# Patient Record
Sex: FEMALE | Race: BLACK OR AFRICAN AMERICAN | Employment: OTHER | URBAN - METROPOLITAN AREA
[De-identification: names, ages, dates, MRNs, and addresses within clinical notes are randomized per-mention and may not be internally consistent; named-entity substitution may affect disease eponyms.]

---

## 2017-04-10 ENCOUNTER — HOSPITAL ENCOUNTER (OUTPATIENT)
Dept: MAMMOGRAPHY | Age: 72
Discharge: HOME | End: 2017-04-10
Attending: INTERNAL MEDICINE | Admitting: INTERNAL MEDICINE

## 2017-04-10 DIAGNOSIS — Z12.31 VISIT FOR SCREENING MAMMOGRAM: ICD-10-CM

## 2018-04-11 ENCOUNTER — HOSPITAL ENCOUNTER (OUTPATIENT)
Dept: MAMMOGRAPHY | Age: 73
Discharge: HOME | End: 2018-04-11
Attending: INTERNAL MEDICINE | Admitting: INTERNAL MEDICINE

## 2018-04-11 DIAGNOSIS — Z12.31 VISIT FOR SCREENING MAMMOGRAM: ICD-10-CM

## 2019-04-26 ENCOUNTER — HOSPITAL ENCOUNTER (OUTPATIENT)
Dept: MAMMOGRAPHY | Age: 74
Discharge: HOME | End: 2019-04-26
Payer: MEDICARE

## 2019-04-26 DIAGNOSIS — Z12.31 VISIT FOR SCREENING MAMMOGRAM: ICD-10-CM

## 2019-04-26 PROCEDURE — 77067 SCR MAMMO BI INCL CAD: CPT

## 2019-04-30 ENCOUNTER — HOSPITAL ENCOUNTER (OUTPATIENT)
Dept: MAMMOGRAPHY | Age: 74
Discharge: HOME | End: 2019-04-30
Payer: MEDICARE

## 2019-04-30 DIAGNOSIS — R92.8 ABNORMAL MAMMOGRAM: ICD-10-CM

## 2019-04-30 PROCEDURE — 77065 DX MAMMO INCL CAD UNI: CPT

## 2019-10-22 ENCOUNTER — HOSPITAL ENCOUNTER (OUTPATIENT)
Dept: MAMMOGRAPHY | Age: 74
Discharge: HOME | End: 2019-10-22
Payer: MEDICARE

## 2019-10-22 DIAGNOSIS — R92.8 ABNORMAL MAMMOGRAM: ICD-10-CM

## 2019-10-22 PROCEDURE — G0279 TOMOSYNTHESIS, MAMMO: HCPCS

## 2025-05-25 ENCOUNTER — HOSPITAL ENCOUNTER (EMERGENCY)
Age: 80
Discharge: HOME | End: 2025-05-25
Attending: EMERGENCY MEDICINE
Payer: MEDICARE

## 2025-05-25 ENCOUNTER — APPOINTMENT (OUTPATIENT)
Dept: GENERAL RADIOLOGY | Age: 80
End: 2025-05-25
Payer: MEDICARE

## 2025-05-25 VITALS
HEIGHT: 66 IN | TEMPERATURE: 98.2 F | WEIGHT: 160.9 LBS | OXYGEN SATURATION: 96 % | BODY MASS INDEX: 25.86 KG/M2 | HEART RATE: 99 BPM | RESPIRATION RATE: 18 BRPM | SYSTOLIC BLOOD PRESSURE: 139 MMHG | DIASTOLIC BLOOD PRESSURE: 70 MMHG

## 2025-05-25 DIAGNOSIS — S82.891A CLOSED FRACTURE OF RIGHT ANKLE, INITIAL ENCOUNTER: Primary | ICD-10-CM

## 2025-05-25 PROCEDURE — 73610 X-RAY EXAM OF ANKLE: CPT

## 2025-05-25 PROCEDURE — 73630 X-RAY EXAM OF FOOT: CPT

## 2025-05-25 PROCEDURE — 99283 EMERGENCY DEPT VISIT LOW MDM: CPT

## 2025-05-25 PROCEDURE — 99284 EMERGENCY DEPT VISIT MOD MDM: CPT | Performed by: ORTHOPAEDIC SURGERY

## 2025-05-25 RX ORDER — HYDROCODONE BITARTRATE AND ACETAMINOPHEN 5; 325 MG/1; MG/1
1 TABLET ORAL EVERY 6 HOURS PRN
Qty: 12 TABLET | Refills: 0 | Status: SHIPPED | OUTPATIENT
Start: 2025-05-25 | End: 2025-05-28

## 2025-05-25 NOTE — DISCHARGE INSTRUCTIONS
Do not bear weight on your right foot until cleared by your orthopedic surgeon.  Use crutches to keep weight off right foot.

## 2025-05-25 NOTE — CONSULTS
[facility] Orthopedic Surgery  Consult Note         This patient is seen in consultation at the request of Dr Belcher, Husam MACK MD     Reason for Consult:  Right ankle pain / lateral malleolus mildly displaced fracture with medial malleus avulsion fracture.    CHIEF COMPLAINT:  Right ankle pain / lateral malleolus mildly displaced fracture with medial malleus avulsion fracture.    History Obtained From:  patient, family member - daughter, electronic medical record    HISTORY OF PRESENT ILLNESS:    Ms. Lima 79 y.o.  female seen today at [facility] ED for evaluation of a right ankle injury which occurred when she was walking and tripped on 5/22/2025 while in vacation at [place] and managed to walk on it until she cam to today to [facility] ED.  She was first seen and evaluated in Urgent Care then came to [facility], where she was x-rayed, and I was consulted. She is complaining of medial & lateral ankle pain and swelling. This is better with elevation and worse with bearing any wt. The pain is sharp and not radiating. No numbness or tingling sensation. Alleviating factors: rest. No other complaint.     Past Medical History:        Diagnosis Date    'Light-for-dates' infant with signs of fetal malnutrition 7/6/2010    Acute upper respiratory infections of unspecified site 7/6/2010    Arthritis     DJD (degenerative joint disease) of lumbar spine     Early menopause     Esophageal reflux 7/6/2010    GERD (gastroesophageal reflux disease)     History of colonoscopy 4/09    (-)    Hyperlipidemia     Hypertension     Other and unspecified hyperlipidemia 7/6/2010    Spondylosis of unspecified site without mention of myelopathy 7/6/2010    Unspecified essential hypertension 7/6/2010    UTI        Past Surgical History:        Procedure Laterality Date    DILATION AND CURETTAGE OF UTERUS  1/24/14    HYSTERECTOMY (CERVIX STATUS UNKNOWN)  1/24/14       Medications prior to admission:   Prior to Admission medications

## 2025-05-25 NOTE — ED PROVIDER NOTES
[facility]  EMERGENCY DEPARTMENT ENCOUNTER          ATTENDING PHYSICIAN NOTE       Date of evaluation: 5/25/2025    Chief Complaint     Foot Pain (Pt arrives to ED after falling in [employer] a few days ago, returned home yesterday. Bruising noted to top of big toe to R foot.)      History of Present Illness     Tye Lima is a 79 y.o. female who presents with complaints of right great toe and right foot pain as well as right ankle pain after a fall 3 days ago while in [place].  This was a mechanical fall and the exact mechanism is unclear but she got tangled up in her purse and went to the ground.  She has been able to bear weight but minimally so she also sustained some bruising of her left hand.  She denies head injury.    Review of Systems     Denies numbness, paresthesia, weakness.  See HPI for further details.  Review of systems otherwise negative.    Past Medical, Surgical, Family, and Social History     Nursing Notes, Past Medical Hx, Past Surgical Hx, Social Hx, Allergies, and Family Hx were all reviewed in the electronic record and agreed with, or any disagreements were addressed in the HPI or corrected in the EMR.    Medications     Previous Medications    BENAZEPRIL (LOTENSIN) 10 MG TABLET    Take 1 tablet by mouth daily.    HYDROCHLOROTHIAZIDE (HYDRODIURIL) 25 MG TABLET    Take 1 tablet by mouth daily.    MELOXICAM (MOBIC) 15 MG TABLET    Take 1 tablet by mouth daily.    SPIRONOLACTONE-HYDROCHLOROTHIAZIDE (ALDACTAZIDE) 25-25 MG PER TABLET    Take 1 tablet by mouth daily.       Allergies     She has no known allergies.    Physical Exam     INITIAL VITALS: BP: 139/70, Temp: 98.2 °F (36.8 °C), Pulse: 99, Respirations: 18, SpO2: 96 %   Constitutional:  Well developed, well nourished, no acute distress, non-toxic appearance   Musculoskeletal: Full range of motion of right foot but limited motion of right great toe.  Tenderness is primarily over the right distal first metatarsal and

## 2025-05-28 ENCOUNTER — PREP FOR PROCEDURE (OUTPATIENT)
Dept: ORTHOPEDIC SURGERY | Age: 80
End: 2025-05-28

## 2025-05-28 ENCOUNTER — OFFICE VISIT (OUTPATIENT)
Dept: ORTHOPEDIC SURGERY | Age: 80
End: 2025-05-28
Payer: MEDICARE

## 2025-05-28 VITALS — BODY MASS INDEX: 27.48 KG/M2 | WEIGHT: 171 LBS | HEIGHT: 66 IN

## 2025-05-28 DIAGNOSIS — M25.571 ACUTE RIGHT ANKLE PAIN: Primary | ICD-10-CM

## 2025-05-28 DIAGNOSIS — S82.841A CLOSED BIMALLEOLAR FRACTURE OF RIGHT ANKLE, INITIAL ENCOUNTER: ICD-10-CM

## 2025-05-28 PROCEDURE — 99215 OFFICE O/P EST HI 40 MIN: CPT | Performed by: ORTHOPAEDIC SURGERY

## 2025-05-28 PROCEDURE — G8427 DOCREV CUR MEDS BY ELIG CLIN: HCPCS | Performed by: ORTHOPAEDIC SURGERY

## 2025-05-28 PROCEDURE — G8419 CALC BMI OUT NRM PARAM NOF/U: HCPCS | Performed by: ORTHOPAEDIC SURGERY

## 2025-05-28 PROCEDURE — 1123F ACP DISCUSS/DSCN MKR DOCD: CPT | Performed by: ORTHOPAEDIC SURGERY

## 2025-05-28 PROCEDURE — G8399 PT W/DXA RESULTS DOCUMENT: HCPCS | Performed by: ORTHOPAEDIC SURGERY

## 2025-05-28 PROCEDURE — 1090F PRES/ABSN URINE INCON ASSESS: CPT | Performed by: ORTHOPAEDIC SURGERY

## 2025-05-28 PROCEDURE — 1036F TOBACCO NON-USER: CPT | Performed by: ORTHOPAEDIC SURGERY

## 2025-05-28 PROCEDURE — 1159F MED LIST DOCD IN RCRD: CPT | Performed by: ORTHOPAEDIC SURGERY

## 2025-05-28 PROCEDURE — 1125F AMNT PAIN NOTED PAIN PRSNT: CPT | Performed by: ORTHOPAEDIC SURGERY

## 2025-05-28 RX ORDER — ROSUVASTATIN CALCIUM 5 MG/1
5 TABLET, COATED ORAL DAILY
COMMUNITY
Start: 2024-11-12

## 2025-05-28 RX ORDER — ASPIRIN 81 MG/1
81 TABLET ORAL DAILY
COMMUNITY

## 2025-05-28 NOTE — PROGRESS NOTES
[facility] PRE-OPERATIVE INSTRUCTIONS    Day of Procedure:   6/2/25             Arrival time:      0710          Surgery time: 0840    Take the following medications with a sip of water:  Follow your MD/Surgeons pre-procedure instructions regarding your medications BENAZAPRIL, hctz,spirolactone-hctz-Per Anesthesia please take the DOS with a sip of water.    Do not eat or drink anything after 12:00 midnight prior to your surgery.  This includes water, chewing gum, mints and ice chips.   You may brush your teeth and gargle the morning of your surgery, but do not swallow the water.     Please see your family doctor/pediatrician for a history and physical and/or concerning medications.   Bring any test results/reports from your physicians office.   If you are under the care of a heart doctor or specialist doctor, please be aware that you may be asked to see them for clearance.    You may be asked to stop blood thinners such as Coumadin, Plavix, Fragmin, Lovenox, etc., or any anti-inflammatories such as:  Aspirin, Ibuprofen, Advil, Naproxen prior to your surgery. MAY TAKE TYLENOL   We also ask that you stop any over the counter medications such as fish oil, vitamin E, glucosamine, garlic, Multivitamins, COQ 10, etc.    We ask that you do not smoke 24 hours prior to surgery.  We ask that you do not  drink any alcoholic beverages 24 hours prior to surgery     You must make arrangements for a responsible adult to take you home after your surgery.    For your safety, you will not be allowed to leave alone or drive yourself home.  Your surgery will be cancelled if you do not have a ride home.     Also for your safety, you must have someone stay with you the first 24 hours after your surgery.     A parent or legal guardian must accompany a child scheduled for surgery and plan to stay at the hospital until the child is discharged.    Please do not bring other children with you.    For your comfort, please wear

## 2025-05-28 NOTE — PROGRESS NOTES
CHIEF COMPLAINT: Right ankle pain / medial & lateral malleolus fracture.    DATE OF INJURY: 5/25/2025    HISTORY:  Ms. Lima 79 y.o.  female presents today for the first visit for evaluation of a right ankle injury which occurred when she was walking and tripped while in [place].  She was first seen and evaluated in [facility], where she was x-rayed, splinted and asked to f/u with Orthopedics. She is complaining of medial & lateral ankle pain and swelling 1/10. This is better with elevation and worse with bearing any wt. The pain is sharp and not radiating. No numbness or tingling sensation. Alleviating factors: rest. No other complaint. She is normally very active.     Past Medical History:   Diagnosis Date    'Light-for-dates' infant with signs of fetal malnutrition 7/6/2010    Acute upper respiratory infections of unspecified site 7/6/2010    Arthritis     DJD (degenerative joint disease) of lumbar spine     Early menopause     Esophageal reflux 7/6/2010    GERD (gastroesophageal reflux disease)     History of colonoscopy 4/09    (-)    Hyperlipidemia     Hypertension     Other and unspecified hyperlipidemia 7/6/2010    Spondylosis of unspecified site without mention of myelopathy 7/6/2010    Unspecified essential hypertension 7/6/2010    UTI        Past Surgical History:   Procedure Laterality Date    DILATION AND CURETTAGE OF UTERUS  1/24/14    HYSTERECTOMY (CERVIX STATUS UNKNOWN)  1/24/14       Social History     Socioeconomic History    Marital status:      Spouse name: Not on file    Number of children: Not on file    Years of education: Not on file    Highest education level: Not on file   Occupational History    Not on file   Tobacco Use    Smoking status: Never    Smokeless tobacco: Never   Vaping Use    Vaping status: Unknown   Substance and Sexual Activity    Alcohol use: No    Drug use: Defer    Sexual activity: Defer   Other Topics Concern    Not on file   Social

## 2025-05-30 ENCOUNTER — ANESTHESIA EVENT (OUTPATIENT)
Dept: OPERATING ROOM | Age: 80
End: 2025-05-30
Payer: MEDICARE

## 2025-06-02 ENCOUNTER — APPOINTMENT (OUTPATIENT)
Dept: GENERAL RADIOLOGY | Age: 80
End: 2025-06-02
Attending: ORTHOPAEDIC SURGERY
Payer: MEDICARE

## 2025-06-02 ENCOUNTER — ANESTHESIA (OUTPATIENT)
Dept: OPERATING ROOM | Age: 80
End: 2025-06-02
Payer: MEDICARE

## 2025-06-02 ENCOUNTER — HOSPITAL ENCOUNTER (OUTPATIENT)
Age: 80
Setting detail: OUTPATIENT SURGERY
Discharge: HOME | End: 2025-06-02
Attending: ORTHOPAEDIC SURGERY | Admitting: ORTHOPAEDIC SURGERY
Payer: MEDICARE

## 2025-06-02 VITALS
RESPIRATION RATE: 16 BRPM | WEIGHT: 170 LBS | SYSTOLIC BLOOD PRESSURE: 134 MMHG | HEART RATE: 76 BPM | TEMPERATURE: 97.5 F | BODY MASS INDEX: 27.32 KG/M2 | DIASTOLIC BLOOD PRESSURE: 76 MMHG | OXYGEN SATURATION: 99 % | HEIGHT: 66 IN

## 2025-06-02 PROCEDURE — 6360000002 HC RX W HCPCS: Performed by: NURSE ANESTHETIST, CERTIFIED REGISTERED

## 2025-06-02 PROCEDURE — 6370000000 HC RX 637 (ALT 250 FOR IP): Performed by: ANESTHESIOLOGY

## 2025-06-02 PROCEDURE — 7100000001 HC PACU RECOVERY - ADDTL 15 MIN: Performed by: ORTHOPAEDIC SURGERY

## 2025-06-02 PROCEDURE — 6360000002 HC RX W HCPCS: Performed by: ORTHOPAEDIC SURGERY

## 2025-06-02 PROCEDURE — 27814 TREATMENT OF ANKLE FRACTURE: CPT | Performed by: NURSE PRACTITIONER

## 2025-06-02 PROCEDURE — 2500000003 HC RX 250 WO HCPCS: Performed by: NURSE ANESTHETIST, CERTIFIED REGISTERED

## 2025-06-02 PROCEDURE — 2580000003 HC RX 258: Performed by: ORTHOPAEDIC SURGERY

## 2025-06-02 PROCEDURE — C1713 ANCHOR/SCREW BN/BN,TIS/BN: HCPCS | Performed by: ORTHOPAEDIC SURGERY

## 2025-06-02 PROCEDURE — 7100000000 HC PACU RECOVERY - FIRST 15 MIN: Performed by: ORTHOPAEDIC SURGERY

## 2025-06-02 PROCEDURE — 27814 TREATMENT OF ANKLE FRACTURE: CPT | Performed by: ORTHOPAEDIC SURGERY

## 2025-06-02 PROCEDURE — 3600000014 HC SURGERY LEVEL 4 ADDTL 15MIN: Performed by: ORTHOPAEDIC SURGERY

## 2025-06-02 PROCEDURE — 2720000001 HC MISC SURG SUPPLY STERILE $51-500: Performed by: ORTHOPAEDIC SURGERY

## 2025-06-02 PROCEDURE — 3600000004 HC SURGERY LEVEL 4 BASE: Performed by: ORTHOPAEDIC SURGERY

## 2025-06-02 PROCEDURE — 73600 X-RAY EXAM OF ANKLE: CPT

## 2025-06-02 PROCEDURE — 3700000000 HC ANESTHESIA ATTENDED CARE: Performed by: ORTHOPAEDIC SURGERY

## 2025-06-02 PROCEDURE — 3700000001 HC ADD 15 MINUTES (ANESTHESIA): Performed by: ORTHOPAEDIC SURGERY

## 2025-06-02 PROCEDURE — 6360000002 HC RX W HCPCS: Performed by: ANESTHESIOLOGY

## 2025-06-02 PROCEDURE — 2500000003 HC RX 250 WO HCPCS: Performed by: ORTHOPAEDIC SURGERY

## 2025-06-02 PROCEDURE — 7100000011 HC PHASE II RECOVERY - ADDTL 15 MIN: Performed by: ORTHOPAEDIC SURGERY

## 2025-06-02 PROCEDURE — 2709999900 HC NON-CHARGEABLE SUPPLY: Performed by: ORTHOPAEDIC SURGERY

## 2025-06-02 PROCEDURE — 2580000003 HC RX 258: Performed by: ANESTHESIOLOGY

## 2025-06-02 PROCEDURE — C1769 GUIDE WIRE: HCPCS | Performed by: ORTHOPAEDIC SURGERY

## 2025-06-02 PROCEDURE — 7100000010 HC PHASE II RECOVERY - FIRST 15 MIN: Performed by: ORTHOPAEDIC SURGERY

## 2025-06-02 DEVICE — CORTEX SCREW
Type: IMPLANTABLE DEVICE | Site: ANKLE | Status: FUNCTIONAL
Brand: AXSOS

## 2025-06-02 RX ORDER — OXYCODONE HYDROCHLORIDE 5 MG/1
5 TABLET ORAL PRN
Status: COMPLETED | OUTPATIENT
Start: 2025-06-02 | End: 2025-06-02

## 2025-06-02 RX ORDER — NALOXONE HYDROCHLORIDE 0.4 MG/ML
INJECTION, SOLUTION INTRAMUSCULAR; INTRAVENOUS; SUBCUTANEOUS PRN
Status: DISCONTINUED | OUTPATIENT
Start: 2025-06-02 | End: 2025-06-02 | Stop reason: HOSPADM

## 2025-06-02 RX ORDER — SODIUM CHLORIDE 0.9 % (FLUSH) 0.9 %
5-40 SYRINGE (ML) INJECTION PRN
Status: DISCONTINUED | OUTPATIENT
Start: 2025-06-02 | End: 2025-06-02 | Stop reason: HOSPADM

## 2025-06-02 RX ORDER — BUPIVACAINE HYDROCHLORIDE 5 MG/ML
INJECTION, SOLUTION EPIDURAL; INTRACAUDAL; PERINEURAL
Status: DISCONTINUED | OUTPATIENT
Start: 2025-06-02 | End: 2025-06-02 | Stop reason: HOSPADM

## 2025-06-02 RX ORDER — OXYCODONE HYDROCHLORIDE 10 MG/1
10 TABLET ORAL PRN
Status: COMPLETED | OUTPATIENT
Start: 2025-06-02 | End: 2025-06-02

## 2025-06-02 RX ORDER — ONDANSETRON 2 MG/ML
INJECTION INTRAMUSCULAR; INTRAVENOUS
Status: DISCONTINUED | OUTPATIENT
Start: 2025-06-02 | End: 2025-06-02 | Stop reason: SDUPTHER

## 2025-06-02 RX ORDER — SODIUM CHLORIDE 0.9 % (FLUSH) 0.9 %
5-40 SYRINGE (ML) INJECTION EVERY 12 HOURS SCHEDULED
Status: DISCONTINUED | OUTPATIENT
Start: 2025-06-02 | End: 2025-06-02 | Stop reason: HOSPADM

## 2025-06-02 RX ORDER — DEXAMETHASONE SODIUM PHOSPHATE 4 MG/ML
INJECTION, SOLUTION INTRA-ARTICULAR; INTRALESIONAL; INTRAMUSCULAR; INTRAVENOUS; SOFT TISSUE
Status: DISCONTINUED | OUTPATIENT
Start: 2025-06-02 | End: 2025-06-02 | Stop reason: SDUPTHER

## 2025-06-02 RX ORDER — LIDOCAINE HYDROCHLORIDE 20 MG/ML
INJECTION, SOLUTION EPIDURAL; INFILTRATION; INTRACAUDAL; PERINEURAL
Status: DISCONTINUED | OUTPATIENT
Start: 2025-06-02 | End: 2025-06-02 | Stop reason: SDUPTHER

## 2025-06-02 RX ORDER — MEPERIDINE HYDROCHLORIDE 25 MG/ML
12.5 INJECTION INTRAMUSCULAR; INTRAVENOUS; SUBCUTANEOUS EVERY 5 MIN PRN
Status: DISCONTINUED | OUTPATIENT
Start: 2025-06-02 | End: 2025-06-02 | Stop reason: HOSPADM

## 2025-06-02 RX ORDER — ESMOLOL HYDROCHLORIDE 10 MG/ML
INJECTION INTRAVENOUS
Status: DISCONTINUED | OUTPATIENT
Start: 2025-06-02 | End: 2025-06-02 | Stop reason: SDUPTHER

## 2025-06-02 RX ORDER — ONDANSETRON 2 MG/ML
4 INJECTION INTRAMUSCULAR; INTRAVENOUS
Status: DISCONTINUED | OUTPATIENT
Start: 2025-06-02 | End: 2025-06-02 | Stop reason: HOSPADM

## 2025-06-02 RX ORDER — PROPOFOL 10 MG/ML
INJECTION, EMULSION INTRAVENOUS
Status: DISCONTINUED | OUTPATIENT
Start: 2025-06-02 | End: 2025-06-02 | Stop reason: SDUPTHER

## 2025-06-02 RX ORDER — FENTANYL CITRATE 50 UG/ML
INJECTION, SOLUTION INTRAMUSCULAR; INTRAVENOUS
Status: DISCONTINUED | OUTPATIENT
Start: 2025-06-02 | End: 2025-06-02 | Stop reason: SDUPTHER

## 2025-06-02 RX ORDER — SODIUM CHLORIDE 9 MG/ML
INJECTION, SOLUTION INTRAVENOUS PRN
Status: DISCONTINUED | OUTPATIENT
Start: 2025-06-02 | End: 2025-06-02 | Stop reason: HOSPADM

## 2025-06-02 RX ORDER — ROCURONIUM BROMIDE 10 MG/ML
INJECTION, SOLUTION INTRAVENOUS
Status: DISCONTINUED | OUTPATIENT
Start: 2025-06-02 | End: 2025-06-02 | Stop reason: SDUPTHER

## 2025-06-02 RX ORDER — FENTANYL CITRATE 50 UG/ML
50 INJECTION, SOLUTION INTRAMUSCULAR; INTRAVENOUS EVERY 5 MIN PRN
Status: DISCONTINUED | OUTPATIENT
Start: 2025-06-02 | End: 2025-06-02 | Stop reason: HOSPADM

## 2025-06-02 RX ORDER — FENTANYL CITRATE 50 UG/ML
25 INJECTION, SOLUTION INTRAMUSCULAR; INTRAVENOUS EVERY 5 MIN PRN
Status: DISCONTINUED | OUTPATIENT
Start: 2025-06-02 | End: 2025-06-02 | Stop reason: HOSPADM

## 2025-06-02 RX ORDER — CEPHALEXIN 500 MG/1
500 CAPSULE ORAL 4 TIMES DAILY
Qty: 20 CAPSULE | Refills: 0 | Status: SHIPPED | OUTPATIENT
Start: 2025-06-02 | End: 2025-06-07

## 2025-06-02 RX ADMIN — FENTANYL CITRATE 50 MCG: 50 INJECTION INTRAMUSCULAR; INTRAVENOUS at 09:54

## 2025-06-02 RX ADMIN — FENTANYL CITRATE 50 MCG: 50 INJECTION INTRAMUSCULAR; INTRAVENOUS at 09:41

## 2025-06-02 RX ADMIN — PROPOFOL 50 MG: 10 INJECTION, EMULSION INTRAVENOUS at 08:43

## 2025-06-02 RX ADMIN — SUGAMMADEX 200 MG: 100 INJECTION, SOLUTION INTRAVENOUS at 09:18

## 2025-06-02 RX ADMIN — FENTANYL CITRATE 50 MCG: 50 INJECTION INTRAMUSCULAR; INTRAVENOUS at 08:37

## 2025-06-02 RX ADMIN — SODIUM CHLORIDE 2000 MG: 9 INJECTION, SOLUTION INTRAVENOUS at 08:35

## 2025-06-02 RX ADMIN — LIDOCAINE HYDROCHLORIDE 70 MG: 20 INJECTION, SOLUTION EPIDURAL; INFILTRATION; INTRACAUDAL; PERINEURAL at 08:40

## 2025-06-02 RX ADMIN — SODIUM CHLORIDE: 9 INJECTION, SOLUTION INTRAVENOUS at 08:33

## 2025-06-02 RX ADMIN — FENTANYL CITRATE 50 MCG: 50 INJECTION INTRAMUSCULAR; INTRAVENOUS at 09:08

## 2025-06-02 RX ADMIN — ESMOLOL HYDROCHLORIDE 20 MG: 10 INJECTION, SOLUTION INTRAVENOUS at 08:47

## 2025-06-02 RX ADMIN — PROPOFOL 100 MG: 10 INJECTION, EMULSION INTRAVENOUS at 08:40

## 2025-06-02 RX ADMIN — OXYCODONE HYDROCHLORIDE 10 MG: 10 TABLET ORAL at 10:34

## 2025-06-02 RX ADMIN — ROCURONIUM BROMIDE 40 MG: 10 SOLUTION INTRAVENOUS at 08:40

## 2025-06-02 RX ADMIN — ONDANSETRON 4 MG: 2 INJECTION INTRAMUSCULAR; INTRAVENOUS at 08:57

## 2025-06-02 RX ADMIN — DEXAMETHASONE SODIUM PHOSPHATE 8 MG: 4 INJECTION, SOLUTION INTRAMUSCULAR; INTRAVENOUS at 08:57

## 2025-06-02 ASSESSMENT — PAIN DESCRIPTION - LOCATION
LOCATION: ANKLE

## 2025-06-02 ASSESSMENT — PAIN DESCRIPTION - PAIN TYPE
TYPE: SURGICAL PAIN

## 2025-06-02 ASSESSMENT — PAIN DESCRIPTION - DESCRIPTORS
DESCRIPTORS: ACHING
DESCRIPTORS: ACHING
DESCRIPTORS: DISCOMFORT;ACHING
DESCRIPTORS: ACHING;DISCOMFORT
DESCRIPTORS: ACHING

## 2025-06-02 ASSESSMENT — PAIN SCALES - GENERAL
PAINLEVEL_OUTOF10: 6
PAINLEVEL_OUTOF10: 8
PAINLEVEL_OUTOF10: 7
PAINLEVEL_OUTOF10: 7
PAINLEVEL_OUTOF10: 9
PAINLEVEL_OUTOF10: 7

## 2025-06-02 ASSESSMENT — PAIN - FUNCTIONAL ASSESSMENT
PAIN_FUNCTIONAL_ASSESSMENT: PREVENTS OR INTERFERES SOME ACTIVE ACTIVITIES AND ADLS
PAIN_FUNCTIONAL_ASSESSMENT: 0-10
PAIN_FUNCTIONAL_ASSESSMENT: PREVENTS OR INTERFERES SOME ACTIVE ACTIVITIES AND ADLS
PAIN_FUNCTIONAL_ASSESSMENT: 0-10
PAIN_FUNCTIONAL_ASSESSMENT: NONE - DENIES PAIN

## 2025-06-02 ASSESSMENT — PAIN DESCRIPTION - ORIENTATION
ORIENTATION: RIGHT

## 2025-06-02 ASSESSMENT — PAIN DESCRIPTION - FREQUENCY
FREQUENCY: CONTINUOUS

## 2025-06-02 ASSESSMENT — PAIN DESCRIPTION - ONSET
ONSET: ON-GOING
ONSET: GRADUAL
ONSET: ON-GOING
ONSET: ON-GOING

## 2025-06-02 NOTE — BRIEF OP NOTE
Brief Postoperative Note      Patient: Tye Lima  YOB: 1945  MRN: [id number]    Date of Procedure: 6/2/2025    Pre-Op Diagnosis Codes:      * Closed bimalleolar fracture of right ankle [S82.841A]    Post-Op Diagnosis: Same       Procedure(s):  RIGHT ANKLE BIMALLEOLAR OPEN REDUCTION INTERNAL FIXATION    Surgeon(s):  Katie Mcgrath MD    Assistant: BRENNAN Gonzalez    Anesthesia: General    Estimated Blood Loss (mL): Minimal    Complications: None    Specimens:   * No specimens in log *    Implants:  Implant Name Type Inv. Item Serial No.  Lot No. LRB No. Used Action   2.7 cortex screw      Right 1 Implanted   2.7 locking screw [id number]      Right 1 Implanted   2.7 locking screw [id number]      Right 2 Implanted   2.7 locking screw [id number]      Right 3 Implanted   4 holw right fibula plate      Right 1 Implanted   SCREW BNE L16MM DIA3.5MM STD RADHA TI ST IZABEL LO PROF AXSOS 3 - [id number]  SCREW BNE L16MM DIA3.5MM STD RADHA TI ST IZABEL LO PROF AXSOS 3  [employer]  Right 1 Implanted   SCREW BNE L18MM DIA3.5MM STD RADHA TI ST IZABEL LO PROF AXSOS 3 - [id number]  SCREW BNE L18MM DIA3.5MM STD RADHA TI ST IZABEL LO PROF AXSOS 3  [employer]WD  Right 1 Implanted         Drains: * No LDAs found *    Findings:  Infection Present At Time Of Surgery (PATOS) (choose all levels that have infection present):  No infection present  Other Findings: Same.    Electronically signed by Katie Mcgrath MD on 6/2/2025 at 7:05 PM

## 2025-06-02 NOTE — PROGRESS NOTES
Pt tolerates PO. Oxycodone 10 mg PO given for pain 9/10 in right ankle. Timo delivered pt's prescription. Family present. Call light within reach.

## 2025-06-02 NOTE — PROGRESS NOTES
Pt complains of 7/10 aching pain to her R ankle. PRN fentanyl given; see MAR for medication administration. Ice pack remains in place. RLE elevated. Dressing to R ankle is clean, dry, and intact. Pt on room air, VSS.

## 2025-06-02 NOTE — PROGRESS NOTES
PACU Transfer to [facility]    Vitals:    06/02/25 1005   BP: 132/74   Pulse: 86   Resp: 17   Temp: 96.9 °F (36.1 °C)   SpO2: 92%         Intake/Output Summary (Last 24 hours) at 6/2/2025 1007  Last data filed at 6/2/2025 0921  Gross per 24 hour   Intake 750 ml   Output --   Net 750 ml       Pain assessment:  level of pain (1-10, 10 severe), 6/10 pain to pt's R ankle which she states is tolerable at this time.   Pain Level: 6    Ice pack in place.   RLE elevated.   Dressing to RLE is clean, dry, and intact.     Patient transferred to care of SUMMER RN.    6/2/2025 10:07 AM

## 2025-06-02 NOTE — PROGRESS NOTES
CLINICAL PHARMACY NOTE: MEDS TO BEDS    Total # of Prescriptions Filled: 1   The following medications were delivered to the patient:  Current Discharge Medication List        START taking these medications    Details   cephALEXin (KEFLEX) 500 MG capsule Take 1 capsule by mouth 4 times daily for 5 days  Qty: 20 capsule, Refills: 0               Additional Documentation: Went over instructions with patient and family. Left med on chair with belongings

## 2025-06-02 NOTE — DISCHARGE INSTRUCTIONS
Post op instruction:  1- Discharge home  2- Diagnosis: Right ankle pain / medial & lateral malleolus fracture.   3- Non- weight bearing right ankle  4- Elevation surgical site, with ice  5- Keep splint dry and clean  6- Follow-up in 2 weeks.  7- For DVT prophylaxis- Aspirin 325 mg daily until told to stop by our Office.    Katie Mcgrath MD, 6/2/2025

## 2025-06-02 NOTE — PROGRESS NOTES
Pt reports pain level to her R ankle has improved to 6/10 with PRN medication. Pt states pain level is tolerable at this time.

## 2025-06-02 NOTE — ANESTHESIA POSTPROCEDURE EVALUATION
Department of Anesthesiology  Postprocedure Note    Patient: Tye Lima  MRN: [id number]  YOB: 1945  Date of evaluation: 6/2/2025    Procedure Summary       Date: 06/02/25 Room / Location: [address]    Anesthesia Start: 0835 Anesthesia Stop: 0934    Procedure: RIGHT ANKLE BIMALLEOLAR OPEN REDUCTION INTERNAL FIXATION (Right: Ankle) Diagnosis:       Closed bimalleolar fracture of right ankle      (Closed bimalleolar fracture of right ankle [S82.841A])    Surgeons: Katie Mcgrath MD Responsible Provider: Michele Murphy MD    Anesthesia Type: general ASA Status: 2            Anesthesia Type: No value filed.    Aaron Phase I: Aaron Score: 10    Aaron Phase II: Aaron Score: 10    Anesthesia Post Evaluation    Patient location during evaluation: PACU  Patient participation: complete - patient participated  Level of consciousness: awake and alert  Pain score: 0  Airway patency: patent  Nausea & Vomiting: no nausea and no vomiting  Cardiovascular status: blood pressure returned to baseline  Respiratory status: acceptable  Hydration status: euvolemic  Pain management: adequate    No notable events documented.

## 2025-06-02 NOTE — PROGRESS NOTES
Patient admitted to PACU # 7 from OR at 0930 post right ankle bimalleolar open reduction internal fixation per Dr. Mcgrath.  Attached to PACU monitoring system and report received from anesthesia provider.  Patient was reported to be hemodynamically stable during procedure.  Patient drowsy on admission and denies pain.

## 2025-06-02 NOTE — PROGRESS NOTES
Pain 8/10 in right ankle. Dressing remains CD&I to right ankle. VSS. Discharge instructions reviewed with pt and family. Demonstrated crutch use and printed info for crutch walking. Pt has a walker at home. All verbalize an understanding of instructions. Daughter assisting pt to dress while sitting up on side of stretcher.

## 2025-06-02 NOTE — ANESTHESIA PRE PROCEDURE
03/24/2015 08:22 AM    CO2 29 03/24/2015 08:22 AM    BUN 14 03/24/2015 08:22 AM    CREATININE 0.8 03/24/2015 08:22 AM    GFRAA >60 03/24/2015 08:22 AM    GFRAA >60 02/21/2013 09:03 AM    LABGLOM >60 03/24/2015 08:22 AM    LABGLOM >60 02/21/2013 09:03 AM    GLUCOSE 119 03/24/2015 08:22 AM    CALCIUM 9.7 03/24/2015 08:22 AM    BILITOT 0.3 03/24/2015 08:22 AM    ALKPHOS 72 03/24/2015 08:22 AM    AST 22 03/24/2015 08:22 AM    ALT 20 03/24/2015 08:22 AM       POC Tests: No results for input(s): \"POCGLU\", \"POCNA\", \"POCK\", \"POCCL\", \"POCBUN\", \"POCHEMO\", \"POCHCT\" in the last 72 hours.    Coags: No results found for: \"PROTIME\", \"INR\", \"APTT\"    HCG (If Applicable): No results found for: \"PREGTESTUR\", \"PREGSERUM\", \"HCG\", \"HCGQUANT\"     ABGs: No results found for: \"PHART\", \"PO2ART\", \"HOC2PWS\", \"WLC6WIW\", \"BEART\", \"I3JMOKSS\"     Type & Screen (If Applicable):  No results found for: \"ABORH\", \"LABANTI\"    Drug/Infectious Status (If Applicable):  No results found for: \"HIV\", \"HEPCAB\"    COVID-19 Screening (If Applicable): No results found for: \"COVID19\"        Anesthesia Evaluation  Patient summary reviewed and Nursing notes reviewed   no history of anesthetic complications:   Airway: Mallampati: II  TM distance: >3 FB   Neck ROM: full  Mouth opening: > = 3 FB   Dental: normal exam         Pulmonary:Negative Pulmonary ROS and normal exam                               Cardiovascular:  Exercise tolerance: good (>4 METS)  (+) hypertension:, hyperlipidemia    (-) pacemaker, valvular problems/murmurs, past MI, CAD, CABG/stent, dysrhythmias,  angina,  CHF and no pulmonary hypertension                Neuro/Psych:   Negative Neuro/Psych ROS              GI/Hepatic/Renal:   (+) GERD: well controlled     (-) liver disease       Endo/Other:    (+) : arthritis:..    (-) diabetes mellitus, hypothyroidism, hyperthyroidism               Abdominal:             Vascular: negative vascular ROS.         Other Findings:             Anesthesia

## 2025-06-02 NOTE — PROGRESS NOTES
Pt awake on arrival to phase II. VSS. Pain in right ankle 8-9/10. Right foot elevated and ice to right ankle. Pt moves right toes. Given snack and call light. Family to room. Called retail for prescription.

## 2025-06-02 NOTE — H&P
Preoperative H&P Update    The patient's History and Physical in the medical record from 5/28/2025 was reviewed by me today.    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     Current Outpatient Medications on File Prior to Encounter   Medication Sig Dispense Refill    rosuvastatin (CRESTOR) 5 MG tablet Take 1 tablet by mouth daily      aspirin 81 MG EC tablet Take 1 tablet by mouth daily      benazepril (LOTENSIN) 10 MG tablet Take 1 tablet by mouth daily. 90 tablet 1    hydrochlorothiazide (HYDRODIURIL) 25 MG tablet Take 1 tablet by mouth daily. 90 tablet 1    spironolactone-hydrochlorothiazide (ALDACTAZIDE) 25-25 MG per tablet Take 1 tablet by mouth daily. 90 tablet 1       No Known Allergies   I reviewed the HPI, medications, allergies, reason for surgery, diagnosis and treatment plan and there has been no change.    The patient was evaluated by me today. Physical exam findings for this update include:    There were no vitals filed for this visit.  Airway is intact  Chest: chest clear, no wheezing, rales, normal symmetric air entry, no tachypnea, retractions or cyanosis  Heart: regular rate and rhythm ; heart sounds normal  Findings on exam of the body region where surgery is to be performed include:  Right ankle pain / medial &

## 2025-06-03 NOTE — OP NOTE
[facility]          [address] [place] [address]                            OPERATIVE REPORT      PATIENT NAME: MAYUR LIN                : 1945  MED REC NO: [id number]                      ROOM: OR  ACCOUNT NO: [id number]                       ADMIT DATE: 2025  PROVIDER: Katie Mcgrath MD      DATE OF PROCEDURE:  2025    SURGEON:  Katie Mcgrath MD    ASSISTANT:  Ivory Gonzalez CNP.    PREOPERATIVE DIAGNOSIS:  Right ankle bimalleolar displaced fracture.    POSTOPERATIVE DIAGNOSIS:  Right ankle bimalleolar displaced fracture.    PROCEDURE DONE:  Open treatment of right ankle bimalleolar fracture with open reduction and internal fixation.    ANESTHESIA:  General anesthesia.    ESTIMATED BLOOD LOSS:  Minimal.    COMPLICATIONS:  None.    IMPLANTS USED:  Mauricio titanium Pangea distal fibular locking plate.    INDICATION:  This is an 79-year-old  female, fairly healthy and active who sustained injury while vacationing at [place].  She managed to stay off her foot until she came back to [place] and called her doctor and she got an x-ray that showed bimalleolar fracture.  She was seen in our office and we recommended surgical fixation.  All risks, benefits, and alternatives were discussed with the patient including non-operative treatment and she elected to proceed with surgical fixation.    DESCRIPTION OF PROCEDURE:  The patient's right ankle was marked.  She received 2 g Ancef IV preoperatively.  She was then brought to the operating room, and underwent general anesthesia.  A well-padded tourniquet was placed on the right upper calf.  The right lower extremity was then prepped and draped in regular sterile routine fashion.  A time-out was called confirming the patient's name, site of procedure.    Esmarch was used for exsanguination and tourniquet was inflated to 250 mmHg.  A lateral incision was made over the distal

## 2025-06-17 ENCOUNTER — OFFICE VISIT (OUTPATIENT)
Dept: ORTHOPEDIC SURGERY | Age: 80
End: 2025-06-17

## 2025-06-17 VITALS — WEIGHT: 169.97 LBS | BODY MASS INDEX: 27.32 KG/M2 | HEIGHT: 66 IN

## 2025-06-17 DIAGNOSIS — S82.841A CLOSED BIMALLEOLAR FRACTURE OF RIGHT ANKLE, INITIAL ENCOUNTER: Primary | ICD-10-CM

## 2025-06-17 PROCEDURE — 99024 POSTOP FOLLOW-UP VISIT: CPT | Performed by: NURSE PRACTITIONER

## 2025-06-17 NOTE — PROGRESS NOTES
DIAGNOSIS:  Right ankle bimalleolar displaced fracture, status post ORIF.    DATE OF SURGERY:  6/2/2025.    HISTORY OF PRESENT ILLNESS:  Ms. Lima 79 y.o.  female who came in today for 2 weeks postoperative visit.  The patient denies any significant pain in the right ankle. Rates pain a 0-1/10 VAS mild, aching, intermittent and are improving. Aggravating factors movement. Alleviating factors rest. She has been in a splint, and non WB.  No numbness or tingling sensation. No fever or Chills.     PHYSICAL EXAMINATION:  The incision healing well.  No signs of any erythema or drainage, minimal swelling. She has no pain with the active or passive range of motion of the right ankle, but decrease ROM.  She has intact sensation distally, and she is neurovascularly intact.    IMAGING:  Three views right ankle taken today in the office showed anatomic alignment of the fracture, plate and screws in good position, no loosening. Ankle mortise is well centered.    IMPRESSION:  2 weeks out from right ankle bimalleolar fracture, ORIF and doing very well.    PLAN: She placed in a boot, and can start gradual WB.  Rest and elevate. I have told the patient to work on ROM. She was instructed in incisional care. The patient will come back for a follow up in 6 weeks.  At that time, we will take 3 views of the right ankle standing.        MORTEZA Barker - CNP

## 2025-07-24 ENCOUNTER — TELEPHONE (OUTPATIENT)
Dept: ORTHOPEDIC SURGERY | Age: 80
End: 2025-07-24

## 2025-07-24 NOTE — TELEPHONE ENCOUNTER
Appointment Request     Patient requesting earlier appointment: No  Appointment offered to patient: PATIENT WOULD LIKE TO BE PUT INTO THE 2:00 SPOT   Patient Contact Number: [phone]     Pt RCV'D A CALL TO RESCHEDULE 07/29 APPOINTMENT TO THE AFTERNOON. SHE WOULD LIKE TO COME IN AT 2:00 WITH [facility].     PLEASE CALL TO LET HER KNOW IF THAT IS OK AND THAT SHE HAS BEEN ADDED TO THE SCHEDULE.

## 2025-07-29 ENCOUNTER — OFFICE VISIT (OUTPATIENT)
Dept: ORTHOPEDIC SURGERY | Age: 80
End: 2025-07-29

## 2025-07-29 VITALS — WEIGHT: 169 LBS | HEIGHT: 65 IN | BODY MASS INDEX: 28.16 KG/M2

## 2025-07-29 DIAGNOSIS — S82.841A CLOSED BIMALLEOLAR FRACTURE OF RIGHT ANKLE, INITIAL ENCOUNTER: Primary | ICD-10-CM

## 2025-07-29 PROCEDURE — 99024 POSTOP FOLLOW-UP VISIT: CPT | Performed by: NURSE PRACTITIONER

## 2025-07-29 NOTE — PROGRESS NOTES
DIAGNOSIS:  Right ankle bimalleolar displaced fracture, status post ORIF.    DATE OF SURGERY:  6/2/2025.    HISTORY OF PRESENT ILLNESS:  Ms. Lima 79 y.o.  female who came in today for 8 weeks postoperative visit.  The patient denies any significant pain in the right ankle. Rates pain a 0-1/10 VAS mild, aching, intermittent and are improving. Aggravating factors walking.  Alleviating factors rest. She has been in a boot, and WB.  No numbness or tingling sensation. No fever or Chills.     PHYSICAL EXAMINATION:  The incision healing well.  No signs of any erythema or drainage, minimal swelling. She has no pain with the active or passive range of motion of the right ankle, but decrease ROM.  She has intact sensation distally, and she is neurovascularly intact.    IMAGING:  Three views right ankle taken today in the office showed anatomic alignment of the fracture, plate and screws in good position, no loosening. Ankle mortise is well centered.    IMPRESSION:  8 weeks out from right ankle bimalleolar fracture, ORIF and doing very well.    PLAN: She will be WBAT in the boot, and start aggressive ROM and peroneal strengthening exercise. Off the boot in this week. No heavy impact activities.  The patient will come back for a follow up in 6 weeks.  At that time, we will take 3 views of the right ankle standing.          MORTEZA Barker - CNP

## (undated) DEVICE — DECANTER BAG 9": Brand: MEDLINE INDUSTRIES, INC.

## (undated) DEVICE — GLOVE SURG SZ 8 CRM LTX FREE POLYISOPRENE POLYMER BEAD ANTI

## (undated) DEVICE — LOWER EXTREMITY: Brand: MEDLINE INDUSTRIES, INC.

## (undated) DEVICE — DRILL BIT, AO: Brand: PANGEA

## (undated) DEVICE — TOWEL,OR,DSP,ST,BLUE,STD,4/PK,20PK/CS: Brand: MEDLINE

## (undated) DEVICE — PADDING CAST W6INXL4YD COT LO LINTING WYTEX

## (undated) DEVICE — SUTURE MONOCRYL + SZ 4-0 L18IN ABSRB UD L19MM PS-2 3/8 CIR MCP496G

## (undated) DEVICE — SUTURE VICRYL + SZ 2-0 L18IN ABSRB UD CT1 L36MM 1/2 CIR VCP839D

## (undated) DEVICE — GLOVE SURG 9 PF CRM STRL SENSICARE PI MIC LF

## (undated) DEVICE — GLOVE SURG SZ 65 THK91MIL LTX FREE SYN POLYISOPRENE

## (undated) DEVICE — HYPODERMIC SAFETY NEEDLE: Brand: MONOJECT

## (undated) DEVICE — MASTISOL ADHESIVE LIQ 2/3ML

## (undated) DEVICE — GLOVE SURG SZ 65 L12IN FNGR THK79MIL GRN LTX FREE

## (undated) DEVICE — DRESSING,GAUZE,XEROFORM,CURAD,1"X8",ST: Brand: CURAD

## (undated) DEVICE — ELECTRODE PT RET AD L9FT HI MOIST COND ADH HYDRGEL CORDED

## (undated) DEVICE — DRAPE C ARM W/ POLY STRP W42XL72IN FOR MOB XR

## (undated) DEVICE — TRANSFER SET 3": Brand: MEDLINE INDUSTRIES, INC.

## (undated) DEVICE — UNTHREADED GUIDE WIRE
Type: IMPLANTABLE DEVICE | Site: ANKLE | Status: NON-FUNCTIONAL
Brand: FIXOS
Removed: 2025-06-02

## (undated) DEVICE — BANDAGE COMPR W4INXL10YD WHITE/BEIGE E MTRX HK LOOP CLSR

## (undated) DEVICE — GOWN SIRUS NONREIN XL W/TWL: Brand: MEDLINE INDUSTRIES, INC.

## (undated) DEVICE — STRIP,CLOSURE,WOUND,MEDI-STRIP,1/2X4: Brand: MEDLINE

## (undated) DEVICE — MERCY HEALTH WEST TURNOVER: Brand: MEDLINE INDUSTRIES, INC.

## (undated) DEVICE — DRAPE,REIN 53X77,STERILE: Brand: MEDLINE

## (undated) DEVICE — SUTURE VICRYL + SZ 3-0 L18IN ABSRB UD SH 1/2 CIR TAPERCUT NDL VCP864D